# Patient Record
Sex: FEMALE | Race: WHITE | NOT HISPANIC OR LATINO | URBAN - METROPOLITAN AREA
[De-identification: names, ages, dates, MRNs, and addresses within clinical notes are randomized per-mention and may not be internally consistent; named-entity substitution may affect disease eponyms.]

---

## 2024-09-17 ENCOUNTER — OFFICE VISIT (OUTPATIENT)
Dept: URGENT CARE | Age: 59
End: 2024-09-17
Payer: COMMERCIAL

## 2024-09-17 VITALS
RESPIRATION RATE: 20 BRPM | BODY MASS INDEX: 23.32 KG/M2 | OXYGEN SATURATION: 96 % | TEMPERATURE: 97.2 F | HEIGHT: 65 IN | SYSTOLIC BLOOD PRESSURE: 111 MMHG | HEART RATE: 94 BPM | WEIGHT: 140 LBS | DIASTOLIC BLOOD PRESSURE: 77 MMHG

## 2024-09-17 DIAGNOSIS — J32.9 SINUSITIS, UNSPECIFIED CHRONICITY, UNSPECIFIED LOCATION: Primary | ICD-10-CM

## 2024-09-17 RX ORDER — DOXYCYCLINE 100 MG/1
100 CAPSULE ORAL 2 TIMES DAILY
Qty: 20 CAPSULE | Refills: 0 | Status: SHIPPED | OUTPATIENT
Start: 2024-09-17 | End: 2024-09-27

## 2024-09-17 RX ORDER — ATORVASTATIN CALCIUM 20 MG/1
20 TABLET, FILM COATED ORAL
COMMUNITY
Start: 2024-02-01 | End: 2025-01-26

## 2024-09-17 NOTE — PROGRESS NOTES
"Subjective   Patient ID: Clair Mosqueda is a 58 y.o. female. They present today with a chief complaint of Sinus Problem (X 5 days - teeth pain, sinus pain, congestion, sinus pressure, productive cough).    History of Present Illness  HPI  5-6 days of cough, congestion, postnasal drip runny nose.  Complaining of pain in front teeth.  No fevers have been noted. Patient denies shortness of breath, chest pain, or wheezing. No red eyes, eye pain, or discharge. They deny nausea vomiting or diarrhea. No known exposures to strep mono influenza, COVID-19 or pneumonia. No skin rashes are noted. Over-the-counter medications are offering minimal relief from symptoms.      Past Medical History  Allergies as of 09/17/2024 - Reviewed 09/17/2024   Allergen Reaction Noted    Penicillins Unknown 09/17/2024       (Not in a hospital admission)       History reviewed. No pertinent past medical history.    History reviewed. No pertinent surgical history.     reports that she has never smoked. She has never used smokeless tobacco.    Review of Systems  Review of Systems                               Objective    Vitals:    09/17/24 1209   BP: 111/77   BP Location: Right arm   Patient Position: Sitting   BP Cuff Size: Small adult   Pulse: 94   Resp: 20   Temp: 36.2 °C (97.2 °F)   TempSrc: Temporal   SpO2: 96%   Weight: 63.5 kg (140 lb)   Height: 1.651 m (5' 5\")     No LMP recorded (lmp unknown). Patient is postmenopausal.    Physical Exam  Gen.-alert cooperative and in no apparent distress  Head and face- no swelling redness, tenderness or rash  Eyes-EOMI, no redness or discharge is noted  ENT- bilateral nasal congestion with clear rhinorrhea and postnasal drip in oral pharynx  Neck- normal range of motion with no lymphadenopathy or mass.   Pulmonary- no respiratory distress noted. Lungs clear to auscultation without wheezes rhonchi or rales  Skin- no rashes discoloration or skin lesions noted  Lymphatic-- no lymph node swelling or tenderness " appreciated  Procedures    Point of Care Test & Imaging Results from this visit  No results found for this visit on 09/17/24.   No results found.    Diagnostic study results (if any) were reviewed by Bassam Brand MD.    Assessment/Plan   Allergies, medications, history, and pertinent labs/EKGs/Imaging reviewed by Bassam Brand MD.       Orders and Diagnoses  Diagnoses and all orders for this visit:  Sinusitis, unspecified chronicity, unspecified location  -     doxycycline (Vibramycin) 100 mg capsule; Take 1 capsule (100 mg) by mouth 2 times a day for 10 days. Take with at least 8 ounces (large glass) of water, do not lie down for 30 minutes after      Medical Admin Record      Follow Up Instructions  No follow-ups on file.    Patient disposition: Home    Electronically signed by Bassam Brand MD  12:33 PM

## 2024-09-17 NOTE — PATIENT INSTRUCTIONS
Medication as directed  Use decongestants and Flonase NS as directed  Increase fluidsa and rest  Follow up with PCP if no improvement